# Patient Record
Sex: MALE | Race: WHITE | URBAN - METROPOLITAN AREA
[De-identification: names, ages, dates, MRNs, and addresses within clinical notes are randomized per-mention and may not be internally consistent; named-entity substitution may affect disease eponyms.]

---

## 2017-03-29 ENCOUNTER — THERAPY VISIT (OUTPATIENT)
Dept: PHYSICAL THERAPY | Facility: CLINIC | Age: 67
End: 2017-03-29
Payer: COMMERCIAL

## 2017-03-29 DIAGNOSIS — M54.50 ACUTE MIDLINE LOW BACK PAIN WITHOUT SCIATICA: Primary | ICD-10-CM

## 2017-03-29 PROCEDURE — 97110 THERAPEUTIC EXERCISES: CPT | Mod: GP | Performed by: PHYSICAL THERAPIST

## 2017-03-29 PROCEDURE — 97161 PT EVAL LOW COMPLEX 20 MIN: CPT | Mod: GP | Performed by: PHYSICAL THERAPIST

## 2017-03-29 NOTE — PROGRESS NOTES
[facility] Initial Evaluation    Subjective:    Sylvester Whiteside is a 66 year old male with a lumbar condition.  Condition occurred with:  Insidious onset.  Condition occurred: for unknown reasons.  This is a recurrent condition  Has had stiffness and soreness for several years. Every once and a while, something snaps or moves. Happened mid Jan and went right down to the floor. Spent the day on the cough, went to the bathroom that night and actually passed out from the pain. The pain then last 1 week. Has had episodes like this in the past which self resolved within a week but this episode lasted longer.     At this point, feels he can't walk as far and gets tired faster than he did before.     Right now, just notes a little bit of stiffness and has fear that something is going to happen. Occasionally, will get a little twinge but has not had another episode since Jan.    The first time this every happened was picking up dog poop. Generally he's not doing much when it happens..    Patient reports pain:  Central lumbar spine.  Radiates to: feels the pain is so overwhelming, he's not sure what it's doing.  Quality: right now, is just stiff. When he gets the pain, it's sharp, stabbing, feels like something is pinching. and is intermittent and reported as 2/10 and 10/10.  Associated symptoms:  Loss of motion, loss of motion/stiffness and loss of strength (Very occasionally will have numbness and tingling but not often). Pain is the same all the time.  Symptoms are exacerbated by bending (When the pain is there, everything hurts. When it's not, he just avoids lifting) Relieved by: just time when the episodes occur.  Since onset symptoms are unchanged.  Special tests:  X-ray (Didn't show much, maybe some mild arthritis per patient report).      General health as reported by patient is good.  Pertinent medical history includes:  High blood pressure and sleep disorder/apnea.  Medical allergies: no.     Current medications:  High blood pressure medication.  Current occupation .    Primary job tasks include:  Prolonged sitting.    Barriers include:  None as reported by the patient.    Red flags:  None as reported by the patient.                        Objective:    System         Lumbar/SI Evaluation  ROM:    AROM Lumbar:   Flexion:          To ankles - pain  Ext:                    WNL - pain   Side Bend:        Left:  To knee - pain    Right:  To knee - pain  Rotation:           Left:     Right:   Side Glide:        Left:     Right:           Lumbar Myotomes:  normal                  Neural Tension/Mobility:      Left side:Slump  negative.     Right side:   Slump  negative.   Lumbar Palpation:      Tenderness not present at Left:    Quadratus Lumborum; Erector Spinae; Piriformis; PSIS; Gluteus Medius or Vertebral    Tenderness not present at Right:  Quadratus Lumborum; Erector Spinae; Piriformis; PSIS; Gluteus Medius or Vertebral    Lumbar Provocation:      Left negative with:  Mobility (No pain but did have stuck facet in L L4) and PROM hip    Right negative with:  Mobility and PROM hip    SI joint/Sacrum:    Normal pelvic alignment                                                           General Evaluation:                        Posture:      Sitting:  Rounded shoulders, forward head and lordosis decreased                                           ROS    Assessment/Plan:      Patient is a 66 year old male with lumbar complaints.    Patient has the following significant findings with corresponding treatment plan.                Diagnosis 1:  LBP. Episodes are likely related to stuck facet in L4 on L and significant tightness in LB paraspinals  Pain -  hot/cold therapy, electric stimulation, manual therapy, self management, education and home program  Decreased ROM/flexibility - manual therapy, therapeutic exercise, therapeutic activity and home program  Decreased strength - therapeutic exercise,  therapeutic activities and home program  Impaired muscle performance - neuro re-education and home program  Decreased function - therapeutic activities and home program  Impaired posture - neuro re-education, therapeutic activities and home program    Therapy Evaluation Codes:   1) History comprised of:   Personal factors that impact the plan of care:      None.    Comorbidity factors that impact the plan of care are:      High blood pressure and Sleep disorder/apnea.     Medications impacting care: High blood pressure.  2) Examination of Body Systems comprised of:   Body structures and functions that impact the plan of care:      Lumbar spine.   Activity limitations that impact the plan of care are:      When he has the episodes of pain, feels like everything is limited. When he doesn't, is more guarded and fearful with lifting.  3) Clinical presentation characteristics are:   Stable/Uncomplicated.  4) Decision-Making    Low complexity using standardized patient assessment instrument and/or measureable assessment of functional outcome.  Cumulative Therapy Evaluation is: Low complexity.    Previous and current functional limitations:  (See Goal Flow Sheet for this information)    Short term and Long term goals: (See Goal Flow Sheet for this information)     Communication ability:  Patient appears to be able to clearly communicate and understand verbal and written communication and follow directions correctly.  Treatment Explanation - The following has been discussed with the patient:   RX ordered/plan of care  Anticipated outcomes  Possible risks and side effects  This patient would benefit from PT intervention to resume normal activities.   Rehab potential is good.    Frequency:  1 X week, once daily  Duration:  for 2-6 weeks  Discharge Plan:  Achieve all LTG.  Independent in home treatment program.  Reach maximal therapeutic benefit.    Please refer to the daily flowsheet for treatment today, total treatment time  and time spent performing 1:1 timed codes.

## 2017-03-29 NOTE — MR AVS SNAPSHOT
After Visit Summary   3/29/2017    Sylvester Whiteside    MRN: [id number]           Patient Information     Date Of Birth          1950        Visit Information        Provider Department      3/29/2017 8:50 AM Anaid Rasheed [facility]        Today's Diagnoses     Acute midline low back pain without sciatica    -  1       Follow-ups after your visit        Your next 10 appointments already scheduled     Apr 05, 2017  8:50 AM CDT   [facility] Spine with Anaid Rasheed [facility] ([facility])    [address]  [facility] [id number] [phone]            Apr 14, 2017  9:30 AM CDT   [facility] Spine with Anaid Rasheed [facility] ([facility])    [address] [facility] [id number] [phone]            Apr 19, 2017  8:50 AM CDT   [facility] Spine with Anaid Rasheed [facility] ([facility])    [address] [facility] [id number] [phone]              Who to contact     If you have questions or need follow up information about today's clinic visit or your schedule please contact [facility] directly at [phone].  Normal or non-critical lab and imaging results will be communicated to you by Mito[employer]art, letter or phone within 4 business days after the clinic has received the results. If you do not hear from us within 7 days, please contact the clinic through M[employer] or phone. If you have a critical or abnormal lab result, we will notify you by phone as soon as possible.  Submit refill requests through [employer] or call your pharmacy and they will forward the refill request to us. Please allow 3 business days for your refill to be completed.          Additional Information About Your Visit        [employer] Information     [employer] lets you send messages to your doctor, view your test results, renew your prescriptions, schedule appointments and more. To sign up, go to  [email or web address] . Click on \"Log in\" on the left side of the screen, which will take you to the Welcome page. Then click on \"Sign up Now\" on the right side of the page.     You will be asked to enter the access code listed below, as well as some personal information. Please follow the directions to create your username and password.     Your access code is: [email or web address]  Expires: 2017  9:39 AM     Your access code will  in 90 days. If you need help or a new code, please call your [facility] clinic or [phone].        Care EveryWhere ID     This is your Care EveryWhere ID. This could be used by other organizations to access your [facility] medical records  [id number]         Blood Pressure from Last 3 Encounters:   No data found for BP    Weight from Last 3 Encounters:   No data found for Wt              We Performed the Following     HC PT EVAL, LOW COMPLEXITY     [facility] INITIAL EVAL REPORT     THERAPEUTIC EXERCISES        Primary Care Provider    None Specified       No primary provider on file.        Thank you!     Thank you for choosing [facility]  for your care. Our goal is always to provide you with excellent care. Hearing back from our patients is one way we can continue to improve our services. Please take a few minutes to complete the written survey that you may receive in the mail after your visit with us. Thank you!             Your Updated Medication List - Protect others around you: Learn how to safely use, store and throw away your medicines at www.disposemymeds.org.      Notice  As of 3/29/2017  9:39 AM    You have not been prescribed any medications.      "

## 2017-03-29 NOTE — LETTER
[facility]  [address]  [facility] [address]  [phone]    2017    Re: Sylvester Whiteside   :   1950  MRN:  [id number]   REFERRING PHYSICIAN:   Unknown Referring     [facility]    Date of Initial Evaluation:  2017  Visits:  Rxs Used: 1  Reason for Referral:  Acute midline low back pain without sciatica    EVALUATION SUMMARY    [facility] Initial Evaluation    Subjective:    Sylvester Whiteside is a 66 year old male with a lumbar condition.  Condition occurred with:  Insidious onset.  Condition occurred: for unknown reasons.  This is a recurrent condition  Has had stiffness and soreness for several years. Every once and a while, something snaps or moves. Happened mid  and went right down to the floor. Spent the day on the cough, went to the bathroom that night and actually passed out from the pain. The pain then last 1 week. Has had episodes like this in the past which self resolved within a week but this episode lasted longer.     At this point, feels he can't walk as far and gets tired faster than he did before.   Right now, just notes a little bit of stiffness and has fear that something is going to happen. Occasionally, will get a little twinge but has not had another episode since .    The first time this every happened was picking up dog poop. Generally he's not doing much when it happens..    Patient reports pain:  Central lumbar spine.  Radiates to: feels the pain is so overwhelming, he's not sure what it's doing.  Quality: right now, is just stiff. When he gets the pain, it's sharp, stabbing, feels like something is pinching. and is intermittent and reported as 2/10 and 10/10.  Associated symptoms:  Loss of motion, loss of motion/stiffness and loss of strength (Very occasionally will have numbness and tingling but not often). Pain is the same all the time.  Symptoms are exacerbated by bending (When the pain  is there, everything hurts. When it's not, he just avoids lifting) Relieved by: just time when the episodes occur.  Since onset symptoms are unchanged.  Special tests:  X-ray (Didn't show much, maybe some mild arthritis per patient report).      General health as reported by patient is good.  Pertinent medical history includes:  High blood pressure and sleep disorder/apnea.  Medical allergies: no.    Current medications:  High blood pressure medication.  Current occupation .    Primary job tasks include:  Prolonged sitting. Barriers include:  None as reported by the patient. Red flags:  None as reported by the patient.  Re: Sylvester Galanadentam   :   1950                    Lumbar/SI Evaluation  ROM:    AROM Lumbar:   Flexion:          To ankles - pain  Ext:                    WNL - pain   Side Bend:        Left:  To knee - pain    Right:  To knee - pain  Rotation:           Left:     Right:   Side Glide:        Left:     Right:           Lumbar Myotomes:  normal  Neural Tension/Mobility:    Left side:Slump  negative.   Right side:   Slump  negative.   Lumbar Palpation:    Tenderness not present at Left:    Quadratus Lumborum; Erector Spinae; Piriformis; PSIS; Gluteus Medius or Vertebral  Tenderness not present at Right:  Quadratus Lumborum; Erector Spinae; Piriformis; PSIS; Gluteus Medius or Vertebral    Lumbar Provocation:    Left negative with:  Mobility (No pain but did have stuck facet in L L4) and PROM hip  Right negative with:  Mobility and PROM hip  SI joint/Sacrum:    Normal pelvic alignment      General Evaluation:  Posture:    Sitting:  Rounded shoulders, forward head and lordosis decreased    Assessment/Plan:      Patient is a 66 year old male with lumbar complaints.    Patient has the following significant findings with corresponding treatment plan.                Diagnosis 1:  LBP. Episodes are likely related to stuck facet in L4 on L and significant tightness in LB paraspinals  Pain -   hot/cold therapy, electric stimulation, manual therapy, self management, education and home program  Decreased ROM/flexibility - manual therapy, therapeutic exercise, therapeutic activity and home program  Decreased strength - therapeutic exercise, therapeutic activities and home program  Impaired muscle performance - neuro re-education and home program  Decreased function - therapeutic activities and home program  Impaired posture - neuro re-education, therapeutic activities and home program            Re: Sylvester Whiteside   :   1950    Therapy Evaluation Codes:   1) History comprised of:   Personal factors that impact the plan of care:      None.    Comorbidity factors that impact the plan of care are:      High blood pressure and Sleep disorder/apnea.     Medications impacting care: High blood pressure.  2) Examination of Body Systems comprised of:   Body structures and functions that impact the plan of care:      Lumbar spine.   Activity limitations that impact the plan of care are:      When he has the episodes of pain, feels like everything is limited. When he doesn't, is more guarded and fearful with lifting.  3) Clinical presentation characteristics are:   Stable/Uncomplicated.  4) Decision-Making    Low complexity using standardized patient assessment instrument and/or measureable assessment of functional outcome.  Cumulative Therapy Evaluation is: Low complexity.    Previous and current functional limitations:  (See Goal Flow Sheet for this information)    Short term and Long term goals: (See Goal Flow Sheet for this information)     Communication ability:  Patient appears to be able to clearly communicate and understand verbal and written communication and follow directions correctly.  Treatment Explanation - The following has been discussed with the patient:   RX ordered/plan of care  Anticipated outcomes  Possible risks and side effects  This patient would benefit from PT intervention to resume  normal activities.   Rehab potential is good.    Frequency:  1 X week, once daily  Duration:  for 2-6 weeks  Discharge Plan:  Achieve all LTG.  Independent in home treatment program.  Reach maximal therapeutic benefit.         Thank you for your referral.    INQUIRIES  Therapist: Gisele Rasheed, PT  [facility]  [address]  [facility] [address]  Phone: [phone]

## 2017-04-05 ENCOUNTER — THERAPY VISIT (OUTPATIENT)
Dept: PHYSICAL THERAPY | Facility: CLINIC | Age: 67
End: 2017-04-05
Payer: COMMERCIAL

## 2017-04-05 DIAGNOSIS — M54.50 ACUTE MIDLINE LOW BACK PAIN WITHOUT SCIATICA: ICD-10-CM

## 2017-04-05 PROCEDURE — 97112 NEUROMUSCULAR REEDUCATION: CPT | Mod: GP | Performed by: PHYSICAL THERAPIST

## 2017-04-05 PROCEDURE — 97110 THERAPEUTIC EXERCISES: CPT | Mod: GP | Performed by: PHYSICAL THERAPIST

## 2017-04-14 ENCOUNTER — THERAPY VISIT (OUTPATIENT)
Dept: PHYSICAL THERAPY | Facility: CLINIC | Age: 67
End: 2017-04-14
Payer: COMMERCIAL

## 2017-04-14 DIAGNOSIS — M54.50 ACUTE MIDLINE LOW BACK PAIN WITHOUT SCIATICA: ICD-10-CM

## 2017-04-14 PROCEDURE — 97112 NEUROMUSCULAR REEDUCATION: CPT | Mod: GP | Performed by: PHYSICAL THERAPIST

## 2017-04-14 PROCEDURE — 97110 THERAPEUTIC EXERCISES: CPT | Mod: GP | Performed by: PHYSICAL THERAPIST

## 2017-04-26 ENCOUNTER — THERAPY VISIT (OUTPATIENT)
Dept: PHYSICAL THERAPY | Facility: CLINIC | Age: 67
End: 2017-04-26
Payer: COMMERCIAL

## 2017-04-26 DIAGNOSIS — M54.50 ACUTE MIDLINE LOW BACK PAIN WITHOUT SCIATICA: ICD-10-CM

## 2017-04-26 PROCEDURE — 97110 THERAPEUTIC EXERCISES: CPT | Mod: GP | Performed by: PHYSICAL THERAPIST

## 2017-04-26 PROCEDURE — 97112 NEUROMUSCULAR REEDUCATION: CPT | Mod: GP | Performed by: PHYSICAL THERAPIST

## 2017-05-31 ENCOUNTER — THERAPY VISIT (OUTPATIENT)
Dept: PHYSICAL THERAPY | Facility: CLINIC | Age: 67
End: 2017-05-31
Payer: COMMERCIAL

## 2017-05-31 DIAGNOSIS — M54.50 ACUTE MIDLINE LOW BACK PAIN WITHOUT SCIATICA: ICD-10-CM

## 2017-05-31 PROCEDURE — 97140 MANUAL THERAPY 1/> REGIONS: CPT | Mod: GP | Performed by: PHYSICAL THERAPIST

## 2017-05-31 PROCEDURE — 97112 NEUROMUSCULAR REEDUCATION: CPT | Mod: GP | Performed by: PHYSICAL THERAPIST

## 2017-05-31 NOTE — PROGRESS NOTES
Subjective:    HPI                    Objective:    System    Physical Exam    General     ROS    Assessment/Plan:      PROGRESS  REPORT    Progress reporting period is from 3/29/17 to 5/31/17.       SUBJECTIVE  Subjective changes noted by patient:  Patient has not been in PT in 1 month. Does note that he still on occasion will get a pinch/catch in his back. Did have a family weekend and was carrying his grandson a lot and playing with the kids which could have irritated things a bit. Notes that continues to be more of a stiff feeling than a pain. Even the catches that he's had lately aren't as painful as they had been. Does still note he can't walk as far as before and tires faster. Doesn't know at this point if it's related to his back pain or just deconditioning. Has been doing a lot of yardwork and is able to do what he needs to do without pain or fatigue. Is careful at how he lifts however. Doesn't feel like when he gets the twinges, he's doing much of anything. Can be just moving slightly. Has been working on exercises, not as compliant over the past weekend.  Current pain level is 1/10  .     Previous pain level was  1/10-10/10  .   Changes in function:  Yes (See Goal flowsheet attached for changes in current functional level)  Adverse reaction to treatment or activity: None    OBJECTIVE  Changes noted in objective findings:  Yes, Mild to moderate tightness noted in LB paraspinals R>L. Improved overall PA mobility however does have some stiffness around R L4 area yet and has movement more from upper lumbar spine with AROM nohelia into flexion and extension.    Lumbar ROM:   Range Pain   Flexion To feet -   Extension -25% -    Sidebending right To upper knee -   Sidebending left To upper knee -     Pelvic alignment: Normal pelvic alignment          ASSESSMENT/PLAN  Updated problem list and treatment plan: Diagnosis 1:  LBP. Did discuss possible referral to chiro as well given stiffness and lack of mobility in  lower lumbar spine. Pt would like to continue with PT a little while longer before looking into that.   Pain -  hot/cold therapy, manual therapy, self management, education, directional preference exercise and home program  Decreased ROM/flexibility - manual therapy, therapeutic exercise, therapeutic activity and home program  Decreased joint mobility - manual therapy, therapeutic exercise, therapeutic activity and home program  Decreased strength - therapeutic exercise, therapeutic activities and home program  Impaired muscle performance - neuro re-education and home program  Decreased function - therapeutic activities and home program  Impaired posture - neuro re-education, therapeutic activities and home program  STG/LTGs have been met or progress has been made towards goals:  Yes (See Goal flow sheet completed today.)  Assessment of Progress: The patient's condition is improving.  The patient's condition has potential to improve.  Self Management Plans:  Patient has been instructed in a home treatment program.  Patient  has been instructed in self management of symptoms.  I have re-evaluated this patient and find that the nature, scope, duration and intensity of the therapy is appropriate for the medical condition of the patient.  Sylvester continues to require the following intervention to meet STG and LTG's:  PT    Recommendations:  This patient would benefit from continued therapy.     Frequency:  1 X week, once daily  Duration:  for 4-6 weeks        Please refer to the daily flowsheet for treatment today, total treatment time and time spent performing 1:1 timed codes.

## 2017-05-31 NOTE — MR AVS SNAPSHOT
"              After Visit Summary   5/31/2017    Sylvester Whiteside    MRN: [id number]           Patient Information     Date Of Birth          1950        Visit Information        Provider Department      5/31/2017 8:10 AM Anaid Rasheed PT [facility]        Today's Diagnoses     Acute midline low back pain without sciatica           Follow-ups after your visit        Your next 10 appointments already scheduled     Jun 21, 2017  7:30 AM CDT   [facility] Spine with Anaid Rasheed PT   [facility] ([facility])    [address] [facility] [id number] [phone]              Who to contact     If you have questions or need follow up information about today's clinic visit or your schedule please contact [facility] directly at [phone].  Normal or non-critical lab and imaging results will be communicated to you by [employer], letter or phone within 4 business days after the clinic has received the results. If you do not hear from us within 7 days, please contact the clinic through [employer] or phone. If you have a critical or abnormal lab result, we will notify you by phone as soon as possible.  Submit refill requests through [employer] or call your pharmacy and they will forward the refill request to us. Please allow 3 business days for your refill to be completed.          Additional Information About Your Visit        MyChart Information     [employer] lets you send messages to your doctor, view your test results, renew your prescriptions, schedule appointments and more. To sign up, go to [email or web address] . Click on \"Log in\" on the left side of the screen, which will take you to the Welcome page. Then click on \"Sign up Now\" on the right side of the page.     You will be asked to enter the access code listed below, as well as some personal information. Please follow the directions to create your username and password.     Your access code is: " [email or web address]  Expires: 2017  9:39 AM     Your access code will  in 90 days. If you need help or a new code, please call your [place] clinic or [phone].        Care EveryWhere ID     This is your Care EveryWhere ID. This could be used by other organizations to access your [place] medical records  [id number]         Blood Pressure from Last 3 Encounters:   No data found for BP    Weight from Last 3 Encounters:   No data found for Wt              We Performed the Following     [facility] PROGRESS NOTES REPORT     MANUAL THER TECH,1+REGIONS,EA 15 MIN     NEUROMUSCULAR RE-EDUCATION        Primary Care Provider    None Specified       No primary provider on file.        Thank you!     Thank you for choosing [facility]  for your care. Our goal is always to provide you with excellent care. Hearing back from our patients is one way we can continue to improve our services. Please take a few minutes to complete the written survey that you may receive in the mail after your visit with us. Thank you!             Your Updated Medication List - Protect others around you: Learn how to safely use, store and throw away your medicines at www.disposemymeds.org.      Notice  As of 2017  1:01 PM    You have not been prescribed any medications.

## 2017-06-21 ENCOUNTER — THERAPY VISIT (OUTPATIENT)
Dept: PHYSICAL THERAPY | Facility: CLINIC | Age: 67
End: 2017-06-21
Payer: COMMERCIAL

## 2017-06-21 DIAGNOSIS — M54.50 ACUTE MIDLINE LOW BACK PAIN WITHOUT SCIATICA: ICD-10-CM

## 2017-06-21 PROCEDURE — 97110 THERAPEUTIC EXERCISES: CPT | Mod: GP | Performed by: PHYSICAL THERAPIST

## 2017-06-21 PROCEDURE — 97112 NEUROMUSCULAR REEDUCATION: CPT | Mod: GP | Performed by: PHYSICAL THERAPIST

## 2017-06-21 PROCEDURE — 97014 ELECTRIC STIMULATION THERAPY: CPT | Mod: GP | Performed by: PHYSICAL THERAPIST

## 2017-06-21 PROCEDURE — 97140 MANUAL THERAPY 1/> REGIONS: CPT | Mod: GP | Performed by: PHYSICAL THERAPIST

## 2017-07-03 ENCOUNTER — THERAPY VISIT (OUTPATIENT)
Dept: PHYSICAL THERAPY | Facility: CLINIC | Age: 67
End: 2017-07-03
Payer: COMMERCIAL

## 2017-07-03 DIAGNOSIS — M54.50 ACUTE MIDLINE LOW BACK PAIN WITHOUT SCIATICA: ICD-10-CM

## 2017-07-03 PROCEDURE — 97140 MANUAL THERAPY 1/> REGIONS: CPT | Mod: GP | Performed by: PHYSICAL THERAPIST

## 2017-07-03 PROCEDURE — 97014 ELECTRIC STIMULATION THERAPY: CPT | Mod: GP | Performed by: PHYSICAL THERAPIST

## 2017-07-03 PROCEDURE — 97110 THERAPEUTIC EXERCISES: CPT | Mod: GP | Performed by: PHYSICAL THERAPIST

## 2017-07-19 ENCOUNTER — THERAPY VISIT (OUTPATIENT)
Dept: PHYSICAL THERAPY | Facility: CLINIC | Age: 67
End: 2017-07-19
Payer: COMMERCIAL

## 2017-07-19 DIAGNOSIS — M54.50 ACUTE MIDLINE LOW BACK PAIN WITHOUT SCIATICA: ICD-10-CM

## 2017-07-19 PROCEDURE — 97112 NEUROMUSCULAR REEDUCATION: CPT | Mod: GP | Performed by: PHYSICAL THERAPIST

## 2017-07-19 PROCEDURE — 97140 MANUAL THERAPY 1/> REGIONS: CPT | Mod: GP | Performed by: PHYSICAL THERAPIST

## 2017-07-19 PROCEDURE — 97110 THERAPEUTIC EXERCISES: CPT | Mod: GP | Performed by: PHYSICAL THERAPIST

## 2017-07-19 NOTE — PROGRESS NOTES
Subjective:    HPI                    Objective:    System    Physical Exam    General     ROS    Assessment/Plan:      PROGRESS  REPORT    Progress reporting period is from 5/31/17 to 7/19/17.       SUBJECTIVE  Subjective changes noted by patient: Pt reports that he's about 70% better since starting PT. Pt reports that he is so much better than when he started but he still continues to have the stiffness in his spine. Did see chiro again about 1 week ago but this time, he was in a signfiicant amount of pain for a few days and then it gradualyl went to back to where it has been. Still not having sharp pains. Over the weekend, when he back was real bad, the fatigue was worse again. Before he went to the chiropractor, he was doing a lot of physical activities, including lifting, cleaning the barn, etc.    Current pain level is 3/10  .     Previous pain level was  1/10  .   Changes in function:  Yes (See Goal flowsheet attached for changes in current functional level)  Adverse reaction to treatment or activity: None    OBJECTIVE  Changes noted in objective findings:  Yes, Minimal to no tightness noted in LB paraspinals. Was just slightly stuck in lower thoracic spine on the R. Improved with mobilization today. No stiffness noted in lower lumbar spine. Significant improvement noted in abdominal/core strength.    Lumbar ROM:   Range Pain   Flexion WNL    Extension WNL    Sidebending right WNL    Sidebending left WNL        Pelvic alignment: Normal        ASSESSMENT/PLAN  Updated problem list and treatment plan: Diagnosis 1:  LBP  Pain -  hot/cold therapy, electric stimulation, manual therapy, self management, education and home program  Decreased ROM/flexibility - manual therapy, therapeutic exercise, therapeutic activity and home program  Decreased joint mobility - manual therapy, therapeutic exercise, therapeutic activity and home program  Decreased strength - therapeutic exercise, therapeutic activities and home  program  Impaired muscle performance - neuro re-education and home program  Decreased function - therapeutic activities and home program  STG/LTGs have been met or progress has been made towards goals:  Yes (See Goal flow sheet completed today.)  Assessment of Progress: The patient's condition is improving.  Self Management Plans:  Patient is independent in a home treatment program.  Patient is independent in self management of symptoms.    Sylvester continues to require the following intervention to meet STG and LTG's:  PT intervention is no longer required to meet STG/LTG.    Recommendations:  Pt to try thing on his own for 3-4 weeks. Will call if problems arise or will D/C at that time if tolerated.    Please refer to the daily flowsheet for treatment today, total treatment time and time spent performing 1:1 timed codes.

## 2017-07-19 NOTE — MR AVS SNAPSHOT
"              After Visit Summary   2017    Sylvester Whiteside    MRN: [id number]           Patient Information     Date Of Birth          1950        Visit Information        Provider Department      2017 8:10 AM Anaid Rasheed PT [facility]        Today's Diagnoses     Acute midline low back pain without sciatica           Follow-ups after your visit        Who to contact     If you have questions or need follow up information about today's clinic visit or your schedule please contact [facility] directly at [phone].  Normal or non-critical lab and imaging results will be communicated to you by [employer], letter or phone within 4 business days after the clinic has received the results. If you do not hear from us within 7 days, please contact the clinic through [employer] or phone. If you have a critical or abnormal lab result, we will notify you by phone as soon as possible.  Submit refill requests through [employer] or call your pharmacy and they will forward the refill request to us. Please allow 3 business days for your refill to be completed.          Additional Information About Your Visit        MyChart Information     [employer] lets you send messages to your doctor, view your test results, renew your prescriptions, schedule appointments and more. To sign up, go to [email or web address] . Click on \"Log in\" on the left side of the screen, which will take you to the Welcome page. Then click on \"Sign up Now\" on the right side of the page.     You will be asked to enter the access code listed below, as well as some personal information. Please follow the directions to create your username and password.     Your access code is: [email or web address]  Expires: 10/1/2017  8:22 AM     Your access code will  in 90 days. If you need help or a new code, please call your [facility] clinic or [phone].        Care EveryWhere ID     This is your Care EveryWhere ID. This " could be used by other organizations to access your [place] medical records  [id number]         Blood Pressure from Last 3 Encounters:   No data found for BP    Weight from Last 3 Encounters:   No data found for Wt              We Performed the Following     [facility] PROGRESS NOTES REPORT     MANUAL THER TECH,1+REGIONS,EA 15 MIN     NEUROMUSCULAR RE-EDUCATION     THERAPEUTIC EXERCISES        Primary Care Provider    None Specified       No primary provider on file.        Equal Access to Services     [facility]: Hadii aad ku hadasho Soomaali, waaxda luqadaha, qaybta kaalmada adeegyada, waxay idiin hayaan adeeg ingridlauraharika laAntonioaan . So [facility] [phone].    ATENCIÓN: Si habla español, tiene a golden disposición servicios gratuitos de asistencia lingüística. Jacquelineame al [phone].    We comply with applicable federal civil rights laws and [place] laws. We do not discriminate on the basis of race, color, national origin, age, disability sex, sexual orientation or gender identity.            Thank you!     Thank you for choosing [facility]  for your care. Our goal is always to provide you with excellent care. Hearing back from our patients is one way we can continue to improve our services. Please take a few minutes to complete the written survey that you may receive in the mail after your visit with us. Thank you!             Your Updated Medication List - Protect others around you: Learn how to safely use, store and throw away your medicines at www.disposemymeds.org.      Notice  As of 7/19/2017  9:07 AM    You have not been prescribed any medications.

## 2017-07-19 NOTE — LETTER
[facility]  [address] [place] [address]  [phone]    2017    Re: Sylvester Whiteside   :   1950  MRN:  [id number]   REFERRING PHYSICIAN:   Marline Kimbrough    [facility]    Date of Initial Evaluation:  2017  Visits:  Rxs Used: 8  Reason for Referral:  Acute midline low back pain without sciatica    PROGRESS  REPORT    Progress reporting period is from 17 to 17.       SUBJECTIVE  Subjective changes noted by patient: Pt reports that he's about 70% better since starting PT. Pt reports that he is so much better than when he started but he still continues to have the stiffness in his spine. Did see chiro again about 1 week ago but this time, he was in a signfiicant amount of pain for a few days and then it gradualyl went to back to where it has been. Still not having sharp pains. Over the weekend, when he back was real bad, the fatigue was worse again. Before he went to the chiropractor, he was doing a lot of physical activities, including lifting, cleaning the barn, etc.    Current pain level is 3/10  .     Previous pain level was  1/10  .   Changes in function:  Yes (See Goal flowsheet attached for changes in current functional level)  Adverse reaction to treatment or activity: None    OBJECTIVE  Changes noted in objective findings:  Yes, Minimal to no tightness noted in LB paraspinals. Was just slightly stuck in lower thoracic spine on the R. Improved with mobilization today. No stiffness noted in lower lumbar spine. Significant improvement noted in abdominal/core strength.    Lumbar ROM:   Range Pain   Flexion WNL    Extension WNL    Sidebending right WNL    Sidebending left WNL        Pelvic alignment: Normal        ASSESSMENT/PLAN  Updated problem list and treatment plan: Diagnosis 1:  LBP  Pain -  hot/cold therapy, electric stimulation, manual therapy, self management, education and home program  Decreased ROM/flexibility -  manual therapy, therapeutic exercise, therapeutic activity and home program  Decreased joint mobility - manual therapy, therapeutic exercise, therapeutic activity and home program  Decreased strength - therapeutic exercise, therapeutic activities and home program  Impaired muscle performance - neuro re-education and home program  Decreased function - therapeutic activities and home program  STG/LTGs have been met or progress has been made towards goals:  Yes (See Goal flow sheet completed today.)  Assessment of Progress: The patient's condition is improving.  Self Management Plans:  Patient is independent in a home treatment program.  Patient is independent in self management of symptoms.    Sylvester continues to require the following intervention to meet STG and LTG's:  PT intervention is no longer required to meet STG/LTG.    Recommendations:  Pt to try thing on his own for 3-4 weeks. Will call if problems arise or will D/C at that time if tolerated.          Thank you for your referral.    INQUIRIES  Therapist:Anaid Rasheed PT  [facility]  [address] [place] [address]  Phone: [phone]

## 2018-03-01 RX ORDER — METOPROLOL TARTRATE 50 MG
50-100 TABLET ORAL
Status: CANCELLED | OUTPATIENT
Start: 2018-03-01

## 2018-03-01 NOTE — PROGRESS NOTES
[facility] with [facility] Raiza Estevez.  Admitted for atypical chest pain and ACS rule out.  EKG and troponins negative.  He had equivocal nuclear medicine stress test that called possible inferior ischemia and wall motion abnormality.  When given options, patient elected for medical management and to have coronary CT angiogram, which we offered to have performed at [facility].      Patient to have angiogram Thursday March 8th, instructions for angiogram given to patient.  Results to be forwarded to Dr. Krishna Estevez.    Pedro Braun MD PGY4  Cardiology Fellow  [phone]

## 2018-03-08 ENCOUNTER — HOSPITAL ENCOUNTER (OUTPATIENT)
Dept: CT IMAGING | Facility: CLINIC | Age: 68
Discharge: HOME | End: 2018-03-08
Attending: INTERNAL MEDICINE | Admitting: INTERNAL MEDICINE
Payer: COMMERCIAL

## 2018-03-08 VITALS — SYSTOLIC BLOOD PRESSURE: 121 MMHG | DIASTOLIC BLOOD PRESSURE: 80 MMHG | RESPIRATION RATE: 18 BRPM

## 2018-03-08 DIAGNOSIS — R07.89 ATYPICAL CHEST PAIN: ICD-10-CM

## 2018-03-08 LAB
CREAT BLD-MCNC: 1 MG/DL (ref 0.66–1.25)
GFR SERPL CREATININE-BSD FRML MDRD: 74 ML/MIN/1.7M2

## 2018-03-08 PROCEDURE — 25000125 ZZHC RX 250: Performed by: INTERNAL MEDICINE

## 2018-03-08 PROCEDURE — 25000132 ZZH RX MED GY IP 250 OP 250 PS 637: Performed by: INTERNAL MEDICINE

## 2018-03-08 PROCEDURE — 25000128 H RX IP 250 OP 636: Performed by: INTERNAL MEDICINE

## 2018-03-08 PROCEDURE — 75574 CT ANGIO HRT W/3D IMAGE: CPT

## 2018-03-08 PROCEDURE — 75574 CT ANGIO HRT W/3D IMAGE: CPT | Mod: 26 | Performed by: INTERNAL MEDICINE

## 2018-03-08 PROCEDURE — 82565 ASSAY OF CREATININE: CPT

## 2018-03-08 RX ORDER — ACYCLOVIR 200 MG/1
0-1 CAPSULE ORAL
Status: DISCONTINUED | OUTPATIENT
Start: 2018-03-08 | End: 2018-03-09 | Stop reason: HOSPADM

## 2018-03-08 RX ORDER — METOPROLOL TARTRATE 1 MG/ML
5-15 INJECTION, SOLUTION INTRAVENOUS
Status: DISCONTINUED | OUTPATIENT
Start: 2018-03-08 | End: 2018-03-09 | Stop reason: HOSPADM

## 2018-03-08 RX ORDER — NITROGLYCERIN 0.4 MG/1
0.4 TABLET SUBLINGUAL
Status: DISCONTINUED | OUTPATIENT
Start: 2018-03-08 | End: 2018-03-09 | Stop reason: HOSPADM

## 2018-03-08 RX ORDER — IOPAMIDOL 755 MG/ML
120 INJECTION, SOLUTION INTRAVASCULAR ONCE
Status: COMPLETED | OUTPATIENT
Start: 2018-03-08 | End: 2018-03-08

## 2018-03-08 RX ORDER — METOPROLOL TARTRATE 50 MG
50-100 TABLET ORAL
Status: COMPLETED | OUTPATIENT
Start: 2018-03-08 | End: 2018-03-08

## 2018-03-08 RX ADMIN — METOPROLOL TARTRATE 100 MG: 100 TABLET, FILM COATED ORAL at 08:55

## 2018-03-08 RX ADMIN — IOPAMIDOL 115 ML: 755 INJECTION, SOLUTION INTRAVENOUS at 10:57

## 2018-03-08 RX ADMIN — NITROGLYCERIN 0.4 MG: 0.4 TABLET SUBLINGUAL at 10:37

## 2018-03-08 RX ADMIN — METOPROLOL TARTRATE 40 MG: 5 INJECTION INTRAVENOUS at 10:38

## 2018-03-08 RX ADMIN — SODIUM CHLORIDE, PRESERVATIVE FREE 100 ML: 5 INJECTION INTRAVENOUS at 10:58

## 2018-03-08 NOTE — PROGRESS NOTES
Pt arrived for CTA.  Test, meds and side effects reviewed with pt.  Resting HR greater than 100 bpm.  Pt not on beta blocker at home, given 100 mg PO Metoprolol per protocol.  HR remained >60 one hour after PO administration. Pt given 40 mg IV metoprolol and 0.4 mg SL nitro on CTA table per protocol.  CTA completed, pt tolerated procedure well.  Post monitoring completed and VSS.  D/C instructions reviewed with pt and they verbalized understanding of need to increase PO fluids today.  D/C to [facility] waiting room accompanied by staff.

## 2018-03-14 NOTE — PROGRESS NOTES
Patient with CT coronary angiogram suggesting Severe CAD involving the RCA with at least moderate disease in the  LAD and LCx.  Will set up for invasive angiogram at the [facility].    Pedro Braun MD PGY4  Cardiology Fellow  [phone]

## 2018-03-14 NOTE — ADDENDUM NOTE
Encounter addended by: Pedro Braun MD on: 3/14/2018  8:16 AM<BR>     Actions taken: Pend clinical note, Sign clinical note

## 2018-07-06 PROBLEM — M54.50 ACUTE MIDLINE LOW BACK PAIN WITHOUT SCIATICA: Status: RESOLVED | Noted: 2017-03-29 | Resolved: 2018-07-06

## 2018-09-10 ENCOUNTER — TRANSFERRED RECORDS (OUTPATIENT)
Dept: HEALTH INFORMATION MANAGEMENT | Facility: CLINIC | Age: 68
End: 2018-09-10

## 2020-07-09 ENCOUNTER — TRANSFERRED RECORDS (OUTPATIENT)
Dept: HEALTH INFORMATION MANAGEMENT | Facility: CLINIC | Age: 70
End: 2020-07-09

## 2020-07-09 LAB — HEMOCCULT STL QL IA: NEGATIVE

## 2021-05-27 ENCOUNTER — RECORDS - HEALTHEAST (OUTPATIENT)
Dept: ADMINISTRATIVE | Facility: CLINIC | Age: 71
End: 2021-05-27

## 2021-05-29 ENCOUNTER — RECORDS - HEALTHEAST (OUTPATIENT)
Dept: ADMINISTRATIVE | Facility: CLINIC | Age: 71
End: 2021-05-29

## 2021-06-19 ENCOUNTER — HEALTH MAINTENANCE LETTER (OUTPATIENT)
Age: 71
End: 2021-06-19

## 2021-10-09 ENCOUNTER — HEALTH MAINTENANCE LETTER (OUTPATIENT)
Age: 71
End: 2021-10-09

## 2022-07-16 ENCOUNTER — HEALTH MAINTENANCE LETTER (OUTPATIENT)
Age: 72
End: 2022-07-16

## 2022-09-11 ENCOUNTER — HEALTH MAINTENANCE LETTER (OUTPATIENT)
Age: 72
End: 2022-09-11

## 2023-07-29 ENCOUNTER — HEALTH MAINTENANCE LETTER (OUTPATIENT)
Age: 73
End: 2023-07-29

## (undated) RX ORDER — METOPROLOL TARTRATE 100 MG
TABLET ORAL
Status: DISPENSED
Start: 2018-03-08

## (undated) RX ORDER — METOPROLOL TARTRATE 1 MG/ML
INJECTION, SOLUTION INTRAVENOUS
Status: DISPENSED
Start: 2018-03-08